# Patient Record
Sex: MALE | Race: WHITE | NOT HISPANIC OR LATINO | Employment: UNEMPLOYED | URBAN - METROPOLITAN AREA
[De-identification: names, ages, dates, MRNs, and addresses within clinical notes are randomized per-mention and may not be internally consistent; named-entity substitution may affect disease eponyms.]

---

## 2019-10-26 ENCOUNTER — HOSPITAL ENCOUNTER (EMERGENCY)
Facility: HOSPITAL | Age: 34
Discharge: HOME | End: 2019-10-26
Attending: EMERGENCY MEDICINE | Admitting: EMERGENCY MEDICINE

## 2019-10-26 VITALS
HEIGHT: 65 IN | RESPIRATION RATE: 16 BRPM | SYSTOLIC BLOOD PRESSURE: 162 MMHG | OXYGEN SATURATION: 98 % | WEIGHT: 159 LBS | HEART RATE: 87 BPM | TEMPERATURE: 97.8 F | BODY MASS INDEX: 26.49 KG/M2 | DIASTOLIC BLOOD PRESSURE: 95 MMHG

## 2019-10-26 DIAGNOSIS — J02.9 PHARYNGITIS: ICD-10-CM

## 2019-10-26 DIAGNOSIS — R11.2 NAUSEA AND VOMITING: Primary | ICD-10-CM

## 2019-10-26 LAB — S PYO AG THROAT QL: NEGATIVE

## 2019-10-26 PROCEDURE — 87430 STREP A AG IA: CPT | Performed by: EMERGENCY MEDICINE

## 2019-10-26 PROCEDURE — 96372 THER/PROPH/DIAG INJ SC/IM: CPT

## 2019-10-26 PROCEDURE — 99283 EMERGENCY DEPT VISIT LOW MDM: CPT

## 2019-10-26 PROCEDURE — 99284 EMERGENCY DEPT VISIT MOD MDM: CPT | Performed by: EMERGENCY MEDICINE

## 2019-10-26 RX ORDER — DEXAMETHASONE SODIUM PHOSPHATE 4 MG/ML
10 INJECTION, SOLUTION INTRA-ARTICULAR; INTRALESIONAL; INTRAMUSCULAR; INTRAVENOUS; SOFT TISSUE ONCE
Status: COMPLETED | OUTPATIENT
Start: 2019-10-26 | End: 2019-10-26

## 2019-10-26 RX ORDER — ONDANSETRON 4 MG/1
4 TABLET, ORALLY DISINTEGRATING ORAL ONCE
Status: COMPLETED | OUTPATIENT
Start: 2019-10-26 | End: 2019-10-26

## 2019-10-26 RX ORDER — ONDANSETRON 4 MG/1
4 TABLET, ORALLY DISINTEGRATING ORAL EVERY 6 HOURS PRN
Qty: 8 TABLET | Refills: 0 | Status: SHIPPED | OUTPATIENT
Start: 2019-10-26

## 2019-10-26 RX ADMIN — DEXAMETHASONE SODIUM PHOSPHATE 10 MG: 4 INJECTION, SOLUTION INTRAMUSCULAR; INTRAVENOUS at 08:27

## 2019-10-26 RX ADMIN — ONDANSETRON 4 MG: 4 TABLET, ORALLY DISINTEGRATING ORAL at 07:45

## 2019-10-26 NOTE — ED PROVIDER NOTES
History  Chief Complaint   Patient presents with    Vomiting     I started with vomiting yesterday  Multiple times  I also have a sore throat  I had no diarrhea  HPI    This is a very pleasant 50-year-old male with no major medical history presents to the emergency department with a 2 day history of sore throat with exudates  Patient also knows that he was been vomiting multiple times last evening  Patient works as a  at a local establishment  Unclear whether he ate any spoiled food, coming contact any sick contacts  Patient denies the has had any recent travel outside the geographical area in the last 90 days  Patient noted a low-grade fever last evening  No diarrhea  No blood in the vomit  Patient took Tylenol last evening at approximately 10:00 p m  Dipti Chau Patient has not received any medications for his of complaints above this morning  Patient is requesting a work note  None       History reviewed  No pertinent past medical history  History reviewed  No pertinent surgical history  History reviewed  No pertinent family history  I have reviewed and agree with the history as documented  Social History     Tobacco Use    Smoking status: Current Every Day Smoker     Packs/day: 0 25    Smokeless tobacco: Never Used   Substance Use Topics    Alcohol use: Not Currently    Drug use: Never        Review of Systems   Constitutional: Negative  HENT: Positive for sore throat  Eyes: Negative  Respiratory: Negative  Cardiovascular: Negative  Gastrointestinal: Negative  Endocrine: Negative  Genitourinary: Negative  Musculoskeletal: Negative  Allergic/Immunologic: Negative  Neurological: Negative  Hematological: Negative  Psychiatric/Behavioral: Negative  Physical Exam  Physical Exam   Constitutional: He is oriented to person, place, and time  He appears well-developed and well-nourished  HENT:   Head: Normocephalic and atraumatic     Right Ear: External ear normal    Left Ear: External ear normal    Nose: Nose normal    Mouth/Throat: Oropharyngeal exudate and posterior oropharyngeal edema present  Eyes: Pupils are equal, round, and reactive to light  Conjunctivae and EOM are normal    Neck: Normal range of motion  Neck supple  Cardiovascular: Normal rate, regular rhythm, normal heart sounds and intact distal pulses  Pulmonary/Chest: Effort normal and breath sounds normal    Abdominal: Soft  Bowel sounds are normal    Musculoskeletal: Normal range of motion  Neurological: He is alert and oriented to person, place, and time  Skin: Skin is dry  Capillary refill takes less than 2 seconds  Psychiatric: He has a normal mood and affect  His behavior is normal  Judgment and thought content normal    Nursing note and vitals reviewed  Vital Signs  ED Triage Vitals [10/26/19 0728]   Temperature Pulse Respirations Blood Pressure SpO2   97 8 °F (36 6 °C) 87 16 162/95 98 %      Temp Source Heart Rate Source Patient Position - Orthostatic VS BP Location FiO2 (%)   Tympanic Monitor Sitting Left arm --      Pain Score       No Pain           Vitals:    10/26/19 0728   BP: 162/95   Pulse: 87   Patient Position - Orthostatic VS: Sitting         Visual Acuity      ED Medications  Medications   ondansetron (ZOFRAN-ODT) dispersible tablet 4 mg (4 mg Oral Given 10/26/19 0745)   dexamethasone (DECADRON) injection 10 mg (10 mg Intramuscular Given 10/26/19 0827)       Diagnostic Studies  Results Reviewed     Procedure Component Value Units Date/Time    Rapid Strep A Screen Only, Adults [[id number]]  (Normal) Collected:  10/26/19 0745    Lab Status:  Final result Specimen:  Throat Updated:  10/26/19 0759     Rapid Strep A Screen Negative                 No orders to display              Procedures  Procedures       ED Course  ED Course as of Oct 26 0906   Sat Oct 26, 2019   0816 I reviewed with patient the results of the strep screen which were negative    Explained him that advice as of now our not indicated because there is a movement to decrease on this or antibiotic use but it 48 hours there should be a culture results in the PA's though call him  I reconfirmed the correct mobile number for him and is contact information which is his mother  Patient will be given a shot of Decadron prior to discharge for the pain in his throat and the patient will be also given prescription for Zofran work note  MDM  Number of Diagnoses or Management Options  Nausea and vomiting:   Pharyngitis:   Diagnosis management comments: 16-year-old male presents to the emergency department with sore throat and vomiting  Remaining portion of his physical exam is unremarkable with the exception of oral pharynx appears to be indurated erythematous and swollen tonsils +2 tonsils size  Mild exudate noted on the left side of the tonsils  Strep screen negative  Centor criteria score of 2/4, patient given Decadron and Zofran prior to discharge for the nausea and vomiting  Patient was able to consume 20 oz of water without difficulty without vomiting  Decadron was given for pain  Patient inquired about antibiotics and explain to me it be judicious to hold off and wait for the final results of the strep culture determine whether antibiotics or truly necessary  Work note given         Amount and/or Complexity of Data Reviewed  Clinical lab tests: ordered  Tests in the medicine section of CPT®: ordered        Disposition  Final diagnoses:   Nausea and vomiting   Pharyngitis     Time reflects when diagnosis was documented in both MDM as applicable and the Disposition within this note     Time User Action Codes Description Comment    10/26/2019  7:36 AM Malinda Desouza [R11 2] Nausea and vomiting     10/26/2019  8:17 AM Malinda Desouza [J02 9] Pharyngitis       ED Disposition     ED Disposition Condition Date/Time Comment    Discharge Stable Sat Oct 26, 2019  7:36 AIRAM Weir discharge to home/self care  Follow-up Information     Follow up With Specialties Details Why Contact Info    [facility]   [address] [place] [address]  [phone]            Discharge Medication List as of 10/26/2019  8:19 AM      START taking these medications    Details   ondansetron (ZOFRAN-ODT) 4 mg disintegrating tablet Take 1 tablet (4 mg total) by mouth every 6 (six) hours as needed for nausea or vomiting for up to 8 doses, Starting Sat 10/26/2019, Print           No discharge procedures on file      ED Provider  Electronically Signed by           Mariel Gill III,   10/26/19 7824